# Patient Record
Sex: FEMALE | URBAN - METROPOLITAN AREA
[De-identification: names, ages, dates, MRNs, and addresses within clinical notes are randomized per-mention and may not be internally consistent; named-entity substitution may affect disease eponyms.]

---

## 2023-05-10 ENCOUNTER — APPOINTMENT (RX ONLY)
Dept: URBAN - METROPOLITAN AREA CLINIC 33 | Facility: CLINIC | Age: 63
Setting detail: DERMATOLOGY
End: 2023-05-10

## 2023-05-10 VITALS
HEART RATE: 76 BPM | DIASTOLIC BLOOD PRESSURE: 81 MMHG | SYSTOLIC BLOOD PRESSURE: 147 MMHG | WEIGHT: 193 LBS | HEIGHT: 64 IN

## 2023-05-10 DIAGNOSIS — B35.3 TINEA PEDIS: ICD-10-CM | Status: INADEQUATELY CONTROLLED

## 2023-05-10 DIAGNOSIS — L91.8 OTHER HYPERTROPHIC DISORDERS OF THE SKIN: ICD-10-CM | Status: INADEQUATELY CONTROLLED

## 2023-05-10 PROCEDURE — 99203 OFFICE O/P NEW LOW 30 MIN: CPT

## 2023-05-10 PROCEDURE — ? REFERRAL CORRESPONDENCE

## 2023-05-10 PROCEDURE — ? PRESCRIPTION

## 2023-05-10 PROCEDURE — ? COUNSELING

## 2023-05-10 RX ORDER — KETOCONAZOLE 20 MG/G
A LAYER (1- 2 GM TO EACH FOOT) CREAM TOPICAL BID
Qty: 60 | Refills: 1 | Status: ERX | COMMUNITY
Start: 2023-05-10

## 2023-05-10 RX ADMIN — KETOCONAZOLE A LAYER (1- 2 GM TO EACH FOOT): 20 CREAM TOPICAL at 00:00

## 2023-05-10 ASSESSMENT — LOCATION ZONE DERM
LOCATION ZONE: FEET
LOCATION ZONE: NECK
LOCATION ZONE: LEG
LOCATION ZONE: TRUNK

## 2023-05-10 ASSESSMENT — LOCATION DETAILED DESCRIPTION DERM
LOCATION DETAILED: LEFT RIB CAGE
LOCATION DETAILED: RIGHT ACHILLES SKIN
LOCATION DETAILED: RIGHT LATERAL DORSAL FOOT
LOCATION DETAILED: LEFT LATERAL PLANTAR FOOT
LOCATION DETAILED: RIGHT RIB CAGE
LOCATION DETAILED: RIGHT LATERAL MALLEOLUS
LOCATION DETAILED: RIGHT POSTERIOR ANKLE
LOCATION DETAILED: RIGHT INFERIOR LATERAL NECK
LOCATION DETAILED: RIGHT LATERAL PLANTAR FOOT
LOCATION DETAILED: LEFT LATERAL MALLEOLUS
LOCATION DETAILED: LEFT LATERAL DORSAL FOOT
LOCATION DETAILED: LEFT CLAVICULAR NECK

## 2023-05-10 ASSESSMENT — LOCATION SIMPLE DESCRIPTION DERM
LOCATION SIMPLE: LEFT FOOT
LOCATION SIMPLE: ABDOMEN
LOCATION SIMPLE: RIGHT FOOT
LOCATION SIMPLE: RIGHT ACHILLES SKIN
LOCATION SIMPLE: RIGHT ANTERIOR NECK
LOCATION SIMPLE: RIGHT ANKLE
LOCATION SIMPLE: LEFT ANTERIOR NECK

## 2023-05-10 NOTE — PROCEDURE: COUNSELING
Patient Specific Counseling (Will Not Stick From Patient To Patient): Patient has had challenges with her feet -a nd noted marked pruritus- but of note she mentions today an eruption behind the heel of the left foot.  On exam - she presents moccasin style- she has both triamcinolone at home and also Clotriamazole and nothing has worked. She has been using the Triam - and suspect that has made it worse- we will change to Econazole or ketoconazole pending insurance- reviewed using 2 x daily until next visit- somewhat generously.  Written information on management provided below- Tinactin can also work OTC in gel or cream form.\\n\\nAdvised she must stop using anything at home other than plan below- and definitely no topical steroid as tinea proliferates in presence of steroid.  Reviewed handout given - doo not find nail involvement.\\n\\nTreating the feet and nails-\\nFungal infections often recur- and can develop from nails to the foot and foot to the nails- we are going to treat if treatment not helping return to clinic earlier.\\n\\nHave on hand:\\n1.  Vinegar water- white or apple cider is fine -  large 5 gal size for foot soaks\\n2.  Antifungal cream or over the counter Tinactin, or  prescription if done\\n3.  Vicks Vapo Rub\\n4.  Antifungal powder - in foot isle of store\\n\\n\\nTREATMENT:\\n1.  Soak feet 2 - 3 x week in 50/50 vinegar water\\n2.  Apply at least 1 if not 2 x daily VICKS vapo - rub around the sides, base and tip of nails- to keep fungus isolated to that region\\n3.  Apply your antifungal prescriptive cream (or over the counter tinactin)  2 x daily between all 10 toes on the top and bottom of toes and entire sole of the feet - do this 2 x daily until clear then at least 1 more week  Likely treating until her next appointment.\\n4. If clearing prior to next visit - you may move to maintenance - using antifungal powder in toe of sock before putting shoes on\\n\\nTIPS\\n1.  Rotate wearing of shoes or spray them out at night with antifungal powder\\n2.  Clean nail clipper with rubbing alcohol between the nails\\n3.  Wear clean socks daily\\n\\nIf not managing - it or worsening return to clinic earlier.\\n\\nDiscussed antifungal prescriptive use - risks and potential adverse effects.
Topical Antifungal Recommendations: Tinactin - over the counter
Detail Level: Simple
Patient Specific Counseling (Will Not Stick From Patient To Patient): Reviewed with her skin tags/polyps.  Discussed options for removal as these are rubbing on her clothing and jewelry and becoming irritated and pruritic.  We reviewed cryo versus use of gradels.  She would like them clipped off- discussed process and will have her return for that procedure appointment.  It will need to be end of morning or day appointment.

## 2023-05-22 ENCOUNTER — APPOINTMENT (RX ONLY)
Dept: URBAN - METROPOLITAN AREA CLINIC 33 | Facility: CLINIC | Age: 63
Setting detail: DERMATOLOGY
End: 2023-05-22

## 2023-05-22 VITALS
SYSTOLIC BLOOD PRESSURE: 122 MMHG | HEART RATE: 78 BPM | HEIGHT: 64 IN | WEIGHT: 192 LBS | DIASTOLIC BLOOD PRESSURE: 84 MMHG

## 2023-05-22 DIAGNOSIS — L91.8 OTHER HYPERTROPHIC DISORDERS OF THE SKIN: ICD-10-CM | Status: INADEQUATELY CONTROLLED

## 2023-05-22 DIAGNOSIS — B35.3 TINEA PEDIS: ICD-10-CM

## 2023-05-22 PROBLEM — D48.5 NEOPLASM OF UNCERTAIN BEHAVIOR OF SKIN: Status: ACTIVE | Noted: 2023-05-22

## 2023-05-22 PROCEDURE — 11200 RMVL SKIN TAGS UP TO&INC 15: CPT | Mod: 59

## 2023-05-22 PROCEDURE — 11102 TANGNTL BX SKIN SINGLE LES: CPT

## 2023-05-22 PROCEDURE — ? COUNSELING

## 2023-05-22 PROCEDURE — 99213 OFFICE O/P EST LOW 20 MIN: CPT | Mod: 25

## 2023-05-22 PROCEDURE — ? SKIN TAG REMOVAL MULTI

## 2023-05-22 PROCEDURE — ? BIOPSY BY SHAVE METHOD

## 2023-05-22 ASSESSMENT — LOCATION DETAILED DESCRIPTION DERM
LOCATION DETAILED: RIGHT CENTRAL ANTERIOR NECK
LOCATION DETAILED: LEFT PLANTAR FOREFOOT OVERLYING 3RD METATARSAL
LOCATION DETAILED: RIGHT PLANTAR FOREFOOT OVERLYING 3RD METATARSAL
LOCATION DETAILED: LEFT RIB CAGE
LOCATION DETAILED: LEFT INFERIOR LATERAL NECK
LOCATION DETAILED: RIGHT RIB CAGE
LOCATION DETAILED: LEFT DORSAL FOOT
LOCATION DETAILED: RIGHT INFERIOR LATERAL NECK
LOCATION DETAILED: LEFT CLAVICULAR NECK
LOCATION DETAILED: RIGHT CENTRAL LATERAL NECK
LOCATION DETAILED: RIGHT DORSAL FOOT

## 2023-05-22 ASSESSMENT — LOCATION SIMPLE DESCRIPTION DERM
LOCATION SIMPLE: LEFT ANTERIOR NECK
LOCATION SIMPLE: RIGHT PLANTAR SURFACE
LOCATION SIMPLE: LEFT FOOT
LOCATION SIMPLE: LEFT PLANTAR SURFACE
LOCATION SIMPLE: NECK
LOCATION SIMPLE: ABDOMEN
LOCATION SIMPLE: RIGHT FOOT

## 2023-05-22 ASSESSMENT — LOCATION ZONE DERM
LOCATION ZONE: NECK
LOCATION ZONE: FEET
LOCATION ZONE: TRUNK

## 2023-05-22 NOTE — PROCEDURE: BIOPSY BY SHAVE METHOD
Detail Level: Simple
Depth Of Biopsy: dermis
Was A Bandage Applied: Yes
Size Of Lesion In Cm: 0.2
X Size Of Lesion In Cm: 0
Biopsy Type: H and E
Biopsy Method: Dermablade
Anesthesia Type: 1% lidocaine without epinephrine and a 1:10 solution of 8.4% sodium bicarbonate
Anesthesia Volume In Cc: 0.5
Hemostasis: Drysol
Wound Care: Petrolatum
Dressing: bandage
Destruction After The Procedure: No
Type Of Destruction Used: Curettage
Curettage Text: The wound bed was treated with curettage after the biopsy was performed.
Cryotherapy Text: The wound bed was treated with cryotherapy after the biopsy was performed.
Electrodesiccation Text: The wound bed was treated with electrodesiccation after the biopsy was performed.
Electrodesiccation And Curettage Text: The wound bed was treated with electrodesiccation and curettage after the biopsy was performed.
Silver Nitrate Text: The wound bed was treated with silver nitrate after the biopsy was performed.
Lab: [id number]
Lab Facility: [id number]
Path Notes Override (Will Replace All Of The Above Text): Patient has no history of skin cancer.  This developed on her chin a few months ago and she notes has been growing.  She has had a sun history.  Biopsy today for diagnosis.
Consent: Written consent was obtained and risks were reviewed including but not limited to scarring, infection, bleeding, scabbing, incomplete removal, nerve damage and allergy to anesthesia.
Post-Care Instructions: Reviewed with the patient in detail post-care instructions. Patient is to keep the biopsy site dry and covered with bandaide x 24 hours then remove.  Recommend leaving alone if quite pruritic may use Vaseline or Aquaphor no more than 2 days following.  No antibiotic ointments
Notification Instructions: Patient will be notified of biopsy results. However, patient instructed to call the office if not contacted within 2 weeks.
Billing Type: Third-Party Bill
Information: Selecting Yes will display possible errors in your note based on the variables you have selected. This validation is only offered as a suggestion for you. PLEASE NOTE THAT THE VALIDATION TEXT WILL BE REMOVED WHEN YOU FINALIZE YOUR NOTE. IF YOU WANT TO FAX A PRELIMINARY NOTE YOU WILL NEED TO TOGGLE THIS TO 'NO' IF YOU DO NOT WANT IT IN YOUR FAXED NOTE.
Skin- + several spots of ecchymosis in different sizes to the bilateral medial thigh right more than left

## 2023-05-22 NOTE — PROCEDURE: COUNSELING
Detail Level: Simple
Patient Specific Counseling (Will Not Stick From Patient To Patient): Reviewed post care directions - and advised she can make more.
Patient Specific Counseling (Will Not Stick From Patient To Patient): This is improved- though not resolved.  advised to continue treating with ketoconazole 2 x daily- and when she runs out consideration either to return to clinic for re-evaluation and updated script and/or consider over the counter Tinactin cream/gel\\n\\nHandout given as noted below in caring for feet with problems with tinea -\\n\\nTreating the feet and nails-\\nFungal infections often recur- and can develop from nails to the foot and foot to the nails- we are going to treat if treatment not helping return to clinic earlier.\\n\\nHave on hand:\\n1.  Vinegar water- white or apple cider is fine -  large 5 gal size for foot soaks\\n2.  Antifungal cream or over the counter Tinactin, or  prescription if done\\n3.  Vicks Vapo Rub\\n4.  Antifungal powder - in foot isle of store\\n\\n\\nTREATMENT:\\n1.  Soak feet 2 - 3 x week in 50/50 vinegar water\\n2.  Apply at least 1 if not 2 x daily VICKS vapo - rub around the sides, base and tip of nails- to keep fungus isolated to that region\\n3.  Apply your antifungal prescriptive cream (or over the counter tinactin)  2 x daily between all 10 toes on the top and bottom of toes and entire sole of the feet - do this 2 x daily until clear then at least 1 more week\\n4. If clearing prior to next visit - you may move to maintenance - using antifungal powder in toe of sock before putting shoes on\\n\\nTIPS\\n1.  Rotate wearing of shoes or spray them out at night with antifungal powder\\n2.  Clean nail clipper with rubbing alcohol between the nails\\n3.  Wear clean socks daily\\n**note do not pull or damage the nail that repeated trauma can increase nail problems and fungal problems.  \\n\\nIf not managing - it or worsening return to clinic earlier.
Topical Antifungal Recommendations: Tinactin - over the counter

## 2023-05-22 NOTE — PROCEDURE: SKIN TAG REMOVAL MULTI
Detail Level: Simple
Anesthesia Volume In Cc: 3
Consent: Written consent obtained and the risks of skin tag removal was reviewed with the patient including but not limited to bleeding, pigmentary change, infection, pain, and remote possibility of scarring.
Total Number Of Lesions Treated: 7
Include Z78.9 (Other Specified Conditions Influencing Health Status) As An Associated Diagnosis?: Yes
Medical Necessity Clause: This procedure was medically necessary because the lesions that were treated were: rubbing on clothing, itchy and easily irritant - subject to recurrent trauma
Medical Necessity Information: It is in your best interest to select a reason for this procedure from the list below. All of these items fulfill various CMS LCD requirements except the new and changing color options.
Anesthesia Type: 1% lidocaine without epinephrine